# Patient Record
Sex: FEMALE | Race: WHITE | NOT HISPANIC OR LATINO | Employment: FULL TIME | URBAN - METROPOLITAN AREA
[De-identification: names, ages, dates, MRNs, and addresses within clinical notes are randomized per-mention and may not be internally consistent; named-entity substitution may affect disease eponyms.]

---

## 2021-07-07 ENCOUNTER — VBI (OUTPATIENT)
Dept: ADMINISTRATIVE | Facility: OTHER | Age: 56
End: 2021-07-07

## 2021-07-07 NOTE — TELEPHONE ENCOUNTER
Upon review of the In Basket request we were able to locate, review, and update the patient chart as requested for Immunization(s) Influenza and Pap Smear (HPV) aka Cervical Cancer Screening  Any additional questions or concerns should be emailed to the Practice Liaisons via [email or web address] email, please do not reply via In Basket      Thank you  Oniel Mejia

## 2021-07-23 PROBLEM — Z87.42 HISTORY OF ENDOMETRIOSIS: Status: ACTIVE | Noted: 2021-07-23

## 2021-07-23 PROBLEM — N60.12 FIBROCYSTIC BREAST CHANGES OF BOTH BREASTS: Status: ACTIVE | Noted: 2021-07-23

## 2021-07-23 PROBLEM — N95.2 VAGINAL ATROPHY: Status: ACTIVE | Noted: 2021-07-23

## 2021-07-23 PROBLEM — N60.11 FIBROCYSTIC BREAST CHANGES OF BOTH BREASTS: Status: ACTIVE | Noted: 2021-07-23

## 2021-07-23 PROBLEM — R92.2 DENSE BREAST TISSUE ON MAMMOGRAM: Status: ACTIVE | Noted: 2021-07-23

## 2021-07-23 PROBLEM — Z78.9: Status: ACTIVE | Noted: 2021-07-23

## 2021-07-23 PROBLEM — R92.30 DENSE BREAST TISSUE ON MAMMOGRAM: Status: ACTIVE | Noted: 2021-07-23

## 2021-07-27 ENCOUNTER — ANNUAL EXAM (OUTPATIENT)
Dept: OBGYN CLINIC | Facility: CLINIC | Age: 56
End: 2021-07-27
Payer: COMMERCIAL

## 2021-07-27 VITALS — WEIGHT: 123 LBS | HEIGHT: 62 IN | BODY MASS INDEX: 22.63 KG/M2

## 2021-07-27 DIAGNOSIS — R92.2 DENSE BREAST TISSUE ON MAMMOGRAM: Primary | ICD-10-CM

## 2021-07-27 DIAGNOSIS — N95.2 VAGINAL ATROPHY: ICD-10-CM

## 2021-07-27 DIAGNOSIS — Z78.0 ASYMPTOMATIC MENOPAUSAL STATE: ICD-10-CM

## 2021-07-27 DIAGNOSIS — Z01.419 ENCOUNTER FOR GYNECOLOGICAL EXAMINATION (GENERAL) (ROUTINE) WITHOUT ABNORMAL FINDINGS: ICD-10-CM

## 2021-07-27 DIAGNOSIS — Z13.820 OSTEOPOROSIS SCREENING: ICD-10-CM

## 2021-07-27 PROCEDURE — S0612 ANNUAL GYNECOLOGICAL EXAMINA: HCPCS | Performed by: OBSTETRICS & GYNECOLOGY

## 2021-07-27 NOTE — LETTER
July 27, 2021     [address]  [facility] [address]    Patient: Jc Fried   YOB: 1965   Date of Visit: 7/27/2021       Dear Dr Molly Donnelly: Thank you for referring Domingo Castro to me for evaluation  Below are my notes for this consultation  If you have questions, please do not hesitate to call me  I look forward to following your patient along with you  Sincerely,        Brandy Gould MD        CC: No Recipients  Brandy Gould MD  7/27/2021  8:43 AM  Incomplete  Assessment/Plan:    Encounter for gynecological examination (general) (routine) without abnormal findings  All well, no complaints  Normal breast and pelvic exams, pap due 2024  Mammo, breast u/s and dexa orders given  Diagnoses and all orders for this visit:    Dense breast tissue on mammogram  -     US breast screening bilateral complete (ABUS); Future    Encounter for gynecological examination (general) (routine) without abnormal findings    Asymptomatic menopausal state  -     DXA bone density spine hip and pelvis; Future    Osteoporosis screening  -     DXA bone density spine hip and pelvis; Future    Vaginal atrophy  -     conjugated estrogens (PREMARIN) vaginal cream; Insert 0 5 g into the vagina 2 (two) times a week    Other orders  -     Discontinue: conjugated estrogens (PREMARIN) vaginal cream; Insert into the vagina daily          Subjective:      Patient ID: Jc Fried is a 55 y o  female  Here for well check  The following portions of the patient's history were reviewed and updated as appropriate: allergies, current medications, past family history, past medical history, past social history, past surgical history and problem list     Review of Systems  No PMB, breast, bladder, bowel changes   No new persistent pain, bloating, early satiety or pelvic pressure    Objective:      Ht 5' 2" (1 575 m)   Wt 55 8 kg (123 lb)   Breastfeeding No   BMI 22 50 kg/m²          Physical Exam  General appearance: no distress, pleasant  Neck: thyroid without nodules or thyromegaly, no palpable adenopathy  Lymph nodes: no palpable adenopathy  Breasts: Dense bilaterally, no masses, nodes or skin changes  Abdomen: soft, non tender, no palpable masses  Pelvic exam: normal atrophic external genitalia, urethral meatus normal, vagina atrophic without lesions, cervix atrophic without lesions, uterus small, slight RV towards left, smooth, non tender, no adnexal masses, non tender  Rectal exam: normal sphincter tone, no masses, RV confirms above      Vianney Joshi MD  7/27/2021  8:42 AM  Sign when Signing Visit  Assessment/Plan:    No problem-specific Assessment & Plan notes found for this encounter  Diagnoses and all orders for this visit:    Dense breast tissue on mammogram    Encounter for gynecological examination (general) (routine) without abnormal findings    Other orders  -     conjugated estrogens (PREMARIN) vaginal cream; Insert into the vagina daily          Subjective:      Patient ID: Maribell Wiseman is a 55 y o  female  Here for well check  The following portions of the patient's history were reviewed and updated as appropriate: allergies, current medications, past family history, past medical history, past social history, past surgical history and problem list     Review of Systems  No PMB, breast, bladder, bowel changes   No new persistent pain, bloating, early satiety or pelvic pressure    Objective:      Ht 5' 2" (1 575 m)   Wt 55 8 kg (123 lb)   Breastfeeding No   BMI 22 50 kg/m²          Physical Exam  General appearance: no distress, pleasant  Neck: thyroid without nodules or thyromegaly, no palpable adenopathy  Lymph nodes: no palpable adenopathy  Breasts: Dense bilaterally, no masses, nodes or skin changes  Abdomen: soft, non tender, no palpable masses  Pelvic exam: normal atrophic external genitalia, urethral meatus normal, vagina atrophic without lesions, cervix atrophic without lesions, uterus small, slight RV towards left, smooth, non tender, no adnexal masses, non tender  Rectal exam: normal sphincter tone, no masses, RV confirms above

## 2021-07-27 NOTE — PROGRESS NOTES
Assessment/Plan:    Encounter for gynecological examination (general) (routine) without abnormal findings  All well, no complaints  Normal breast and pelvic exams, pap due 2024  Mammo, breast u/s and dexa orders given  Diagnoses and all orders for this visit:    Dense breast tissue on mammogram  -     US breast screening bilateral complete (ABUS); Future    Encounter for gynecological examination (general) (routine) without abnormal findings    Asymptomatic menopausal state  -     DXA bone density spine hip and pelvis; Future    Osteoporosis screening  -     DXA bone density spine hip and pelvis; Future    Vaginal atrophy  -     conjugated estrogens (PREMARIN) vaginal cream; Insert 0 5 g into the vagina 2 (two) times a week    Other orders  -     Discontinue: conjugated estrogens (PREMARIN) vaginal cream; Insert into the vagina daily          Subjective:      Patient ID: Meghna Reed is a 55 y o  female  Here for well check  The following portions of the patient's history were reviewed and updated as appropriate: allergies, current medications, past family history, past medical history, past social history, past surgical history and problem list     Review of Systems  No PMB, breast, bladder, bowel changes   No new persistent pain, bloating, early satiety or pelvic pressure    Objective:      Ht 5' 2" (1 575 m)   Wt 55 8 kg (123 lb)   Breastfeeding No   BMI 22 50 kg/m²          Physical Exam  General appearance: no distress, pleasant  Neck: thyroid without nodules or thyromegaly, no palpable adenopathy  Lymph nodes: no palpable adenopathy  Breasts: Dense bilaterally, no masses, nodes or skin changes  Abdomen: soft, non tender, no palpable masses  Pelvic exam: normal atrophic external genitalia, urethral meatus normal, vagina atrophic without lesions, cervix atrophic without lesions, uterus small, slight RV towards left, smooth, non tender, no adnexal masses, non tender  Rectal exam: normal sphincter tone, no masses, RV confirms above

## 2021-07-27 NOTE — PATIENT INSTRUCTIONS
Return to office in one year unless having any problems such as bleeding, new persistent pain, new progressive bloating, new problems eating (getting full to quickly) or new constant urinary pressure that does not resolve in one week

## 2021-07-27 NOTE — ASSESSMENT & PLAN NOTE
All well, no complaints  Normal breast and pelvic exams, pap due 2024  Mammo, breast u/s and dexa orders given

## 2021-08-30 ENCOUNTER — TELEPHONE (OUTPATIENT)
Dept: OBGYN CLINIC | Facility: CLINIC | Age: 56
End: 2021-08-30

## 2021-08-30 NOTE — TELEPHONE ENCOUNTER
Please inform pt of dexa with mild osteopenia in hip and spine  Continue to strive for total calcium intake of 1500 mg and vitamin D of 1000 IU in combined dietary and supplement forms  Avoid excess calcium as this may adversely effect the arteries in the heart  I would repeat the test in 5 years  Thanks  (Spine T-1 7; hip T-1 2, fem neck T-1 6   Low risk of fracture)

## 2021-08-31 NOTE — TELEPHONE ENCOUNTER
Return call from BODØ  Her  works in radiology and reviewed her result with her  Reviewed Dr Antonella Vogt recommendations  Patient verbalized understanding and agreement to plan

## 2021-11-11 ENCOUNTER — TELEPHONE (OUTPATIENT)
Dept: OBGYN CLINIC | Facility: CLINIC | Age: 56
End: 2021-11-11

## 2021-11-11 DIAGNOSIS — Z12.31 ENCOUNTER FOR SCREENING MAMMOGRAM FOR MALIGNANT NEOPLASM OF BREAST: Primary | ICD-10-CM

## 2022-09-17 NOTE — PROGRESS NOTES
Assessment/Plan:    Encounter for gynecological examination (general) (routine) without abnormal findings  Three kids all off to college! No complaitns  Normal breast and pelvic exams  Last pap 2019 neg/HPV neg  Pap due   Mammo and bilateral u/s orders given  Up to date with colonoscopy, last , due        Diagnoses and all orders for this visit:    Encounter for gynecological examination (general) (routine) without abnormal findings    Encounter for screening mammogram for malignant neoplasm of breast  -     Mammo screening bilateral w 3d & cad; Future    Dense breast tissue on mammogram  -     US breast screening bilateral complete (ABUS); Future          Subjective:      Patient ID: Avery Giordano is a 65 y o  female  HPI Here for well check  The following portions of the patient's history were reviewed and updated as appropriate:   She  has a past medical history of Diffuse cystic mastopathy, Ectopic pregnancy (2003), History of pelvic ultrasound (2020), Miscarriage, and Papanicolaou smear ()  She   Patient Active Problem List    Diagnosis Date Noted    Encounter for gynecological examination (general) (routine) without abnormal findings 2021    Fibrocystic breast changes of both breasts 2021    Dense breast tissue on mammogram 2021    Vaginal atrophy 2021    History of therapy for infertility 2021    History of endometriosis 2021     She  has a past surgical history that includes  section; Gynecologic cryosurgery; Mammo (historical) (2020); Laparoscopic endometriosis fulguration (); and Colonoscopy ()  Her family history includes Colon cancer in her paternal grandmother; Heart disease in her father and mother; Hypertension in her mother; Uterine cancer in her maternal grandmother  She  reports that she has never smoked  She has never used smokeless tobacco  She reports current alcohol use   She reports that she does not use drugs  Current Outpatient Medications   Medication Sig Dispense Refill    conjugated estrogens (PREMARIN) vaginal cream Insert 0 5 g into the vagina 2 (two) times a week 30 g 3     No current facility-administered medications for this visit  She has No Known Allergies       Review of Systems  No PMB, breast, bladder, bowel changes   No new persistent pain, bloating, early satiety or pelvic pressure      Objective:      /69 (BP Location: Left arm, Patient Position: Sitting, Cuff Size: Standard)   Ht 5' 3" (1 6 m)   Wt 55 2 kg (121 lb 12 8 oz)   BMI 21 58 kg/m²          Physical Exam    General appearance: no distress, pleasant  Neck: thyroid without nodules or thyromegaly, no palpable adenopathy  Lymph nodes: no palpable adenopathy  Breasts: Dense bilaterally, no masses, nodes or skin changes  Abdomen: soft, non tender, no palpable masses  Pelvic exam: normal atrophic external genitalia, urethral meatus normal, vagina atrophic without lesions, cervix atrophic without lesions, uterus small, slight RV towards left, smooth, non tender, no adnexal masses, non tender  Rectal exam: normal sphincter tone, no masses, RV confirms above

## 2022-09-20 ENCOUNTER — ANNUAL EXAM (OUTPATIENT)
Dept: OBGYN CLINIC | Facility: CLINIC | Age: 57
End: 2022-09-20

## 2022-09-20 VITALS
BODY MASS INDEX: 21.58 KG/M2 | DIASTOLIC BLOOD PRESSURE: 69 MMHG | WEIGHT: 121.8 LBS | HEIGHT: 63 IN | SYSTOLIC BLOOD PRESSURE: 102 MMHG

## 2022-09-20 DIAGNOSIS — Z01.419 ENCOUNTER FOR GYNECOLOGICAL EXAMINATION (GENERAL) (ROUTINE) WITHOUT ABNORMAL FINDINGS: Primary | ICD-10-CM

## 2022-09-20 DIAGNOSIS — Z12.31 ENCOUNTER FOR SCREENING MAMMOGRAM FOR MALIGNANT NEOPLASM OF BREAST: ICD-10-CM

## 2022-09-20 DIAGNOSIS — R92.2 DENSE BREAST TISSUE ON MAMMOGRAM: ICD-10-CM

## 2022-09-20 NOTE — ASSESSMENT & PLAN NOTE
Three kids all off to college! No complaitns  Normal breast and pelvic exams  Last pap 2/2019 neg/HPV neg   Pap due 2024  Mammo and bilateral u/s orders given  Up to date with colonoscopy, last 2017, due 2027

## 2022-09-20 NOTE — LETTER
September 22, 2022     [address]  [facility] [address]    Patient: Tena Moulton   YOB: 1965   Date of Visit: 9/20/2022       Dear Dr Nash Stanley: Thank you for referring Ronel Friday to me for evaluation  Below are my notes for this consultation  If you have questions, please do not hesitate to call me  I look forward to following your patient along with you  Sincerely,        Danielle Grant MD        CC: No Recipients  Danielle Grant MD  9/22/2022 11:15 AM  Sign when Signing Visit  Assessment/Plan:    Encounter for gynecological examination (general) (routine) without abnormal findings  Three kids all off to college! No complaitns  Normal breast and pelvic exams  Last pap 2/2019 neg/HPV neg  Pap due 2024  Mammo and bilateral u/s orders given  Up to date with colonoscopy, last 2017, due 2027       Diagnoses and all orders for this visit:    Encounter for gynecological examination (general) (routine) without abnormal findings    Encounter for screening mammogram for malignant neoplasm of breast  -     Mammo screening bilateral w 3d & cad; Future    Dense breast tissue on mammogram  -     US breast screening bilateral complete (ABUS); Future          Subjective:      Patient ID: Tena Moulton is a 65 y o  female  HPI Here for well check  The following portions of the patient's history were reviewed and updated as appropriate:   She  has a past medical history of Diffuse cystic mastopathy, Ectopic pregnancy (01/28/2003), History of pelvic ultrasound (05/28/2020), Miscarriage, and Papanicolaou smear (2019)    She   Patient Active Problem List    Diagnosis Date Noted    Encounter for gynecological examination (general) (routine) without abnormal findings 07/27/2021    Fibrocystic breast changes of both breasts 07/23/2021    Dense breast tissue on mammogram 07/23/2021    Vaginal atrophy 07/23/2021    History of therapy for infertility 2021    History of endometriosis 2021     She  has a past surgical history that includes  section; Gynecologic cryosurgery; Mammo (historical) (2020); Laparoscopic endometriosis fulguration (); and Colonoscopy ()  Her family history includes Colon cancer in her paternal grandmother; Heart disease in her father and mother; Hypertension in her mother; Uterine cancer in her maternal grandmother  She  reports that she has never smoked  She has never used smokeless tobacco  She reports current alcohol use  She reports that she does not use drugs  Current Outpatient Medications   Medication Sig Dispense Refill    conjugated estrogens (PREMARIN) vaginal cream Insert 0 5 g into the vagina 2 (two) times a week 30 g 3     No current facility-administered medications for this visit  She has No Known Allergies       Review of Systems  No PMB, breast, bladder, bowel changes   No new persistent pain, bloating, early satiety or pelvic pressure      Objective:      /69 (BP Location: Left arm, Patient Position: Sitting, Cuff Size: Standard)   Ht 5' 3" (1 6 m)   Wt 55 2 kg (121 lb 12 8 oz)   BMI 21 58 kg/m²          Physical Exam    General appearance: no distress, pleasant  Neck: thyroid without nodules or thyromegaly, no palpable adenopathy  Lymph nodes: no palpable adenopathy  Breasts: Dense bilaterally, no masses, nodes or skin changes  Abdomen: soft, non tender, no palpable masses  Pelvic exam: normal atrophic external genitalia, urethral meatus normal, vagina atrophic without lesions, cervix atrophic without lesions, uterus small, slight RV towards left, smooth, non tender, no adnexal masses, non tender  Rectal exam: normal sphincter tone, no masses, RV confirms above

## 2023-01-26 DIAGNOSIS — Z12.31 ENCOUNTER FOR SCREENING MAMMOGRAM FOR MALIGNANT NEOPLASM OF BREAST: ICD-10-CM

## 2023-07-03 ENCOUNTER — TELEPHONE (OUTPATIENT)
Dept: OBGYN CLINIC | Facility: CLINIC | Age: 58
End: 2023-07-03

## 2023-07-03 DIAGNOSIS — N95.2 VAGINAL ATROPHY: ICD-10-CM

## 2023-07-03 RX ORDER — CONJUGATED ESTROGENS 0.62 MG/G
0.5 CREAM VAGINAL 2 TIMES WEEKLY
Qty: 30 G | Refills: 0 | Status: SHIPPED | OUTPATIENT
Start: 2023-07-03

## 2023-07-03 NOTE — TELEPHONE ENCOUNTER
1 tube sent to pharmacy.  She is due for a wellness this fall and may want to schedule that now due to wait time for appointment

## 2023-11-04 NOTE — PROGRESS NOTES
Assessment/Plan:    Encounter for gynecological examination (general) (routine) without abnormal findings  Here for well check, no complaints. Doing well with vaginal estrogen, refill rx given. Normal breast and pelvic exams. Last pap 2/2019 neg/HPV neg; repeated today  Mammo and u/s orders given, last 1/3/23 with normal bilateral screening u/s  Colonoscopy 2017, due 2027  Dexa 8/2021 mild osteopenia; calcium recs reviewed; repeat next time. Diagnoses and all orders for this visit:    Extremely dense tissue of both breasts on mammography  -     Mammo screening bilateral w 3d & cad; Future  -     US breast screening bilateral complete (ABUS); Future    Encounter for gynecological examination (general) (routine) without abnormal findings    Breast cancer screening by mammogram  -     Mammo screening bilateral w 3d & cad; Future    Screening for malignant neoplasm of the cervix  -     IGP, Aptima HPV, Rfx 16/18,45    Vaginal atrophy  -     estradiol (ESTRACE VAGINAL) 0.1 mg/g vaginal cream; Insert 1 g into the vagina 2 (two) times a week          Subjective:      Patient ID: Cristine Leyden is a 63 y.o. female. HPI Here for well check. The following portions of the patient's history were reviewed and updated as appropriate: She  has a past medical history of Diffuse cystic mastopathy, Ectopic pregnancy (01/28/2003), History of pelvic ultrasound (05/28/2020), History of screening mammography (01/03/2023), Miscarriage, Papanicolaou smear (2019), and Varicella (1973).   She   Patient Active Problem List    Diagnosis Date Noted    Encounter for gynecological examination (general) (routine) without abnormal findings 07/27/2021    Fibrocystic breast changes of both breasts 07/23/2021    Dense breast tissue on mammogram 07/23/2021    Vaginal atrophy 07/23/2021    History of therapy for infertility 07/23/2021    History of endometriosis 07/23/2021     She  has a past surgical history that includes  section; Gynecologic cryosurgery; Mammo (historical) (2020); Laparoscopic endometriosis fulguration (); and Colonoscopy (). Her family history includes Alcohol abuse in her maternal grandfather, paternal grandfather, and paternal grandmother; Breast cancer in her paternal aunt; COPD in her maternal grandmother; Cancer in her paternal grandfather; Colon cancer in her paternal grandmother; Gout in her maternal grandfather; Heart disease in her father, maternal grandfather, and mother; Hyperlipidemia in her maternal grandfather; Hypertension in her maternal grandfather and mother; Leukemia in her paternal aunt; Lung cancer in her maternal grandfather; Melanoma in her paternal grandfather; No Known Problems in her brother, daughter, son, and son; Obesity in her paternal grandmother; Osteoarthritis in her mother; Osteoporosis in her maternal grandmother; Prostate cancer in her father; Stroke in her maternal grandfather; Uterine cancer in her maternal grandmother. She  reports that she has never smoked. She has never used smokeless tobacco. She reports current alcohol use. She reports that she does not use drugs. Current Outpatient Medications   Medication Sig Dispense Refill    [START ON 2023] estradiol (ESTRACE VAGINAL) 0.1 mg/g vaginal cream Insert 1 g into the vagina 2 (two) times a week 42.5 g 2     No current facility-administered medications for this visit. She has No Known Allergies. .    Review of Systems  No PMB, breast, bladder, bowel changes.  No new persistent pain, bloating, early satiety or pelvic pressure      Objective:      /60 (BP Location: Left arm, Patient Position: Sitting, Cuff Size: Standard)   Ht 5' 2.25" (1.581 m)   Wt 55.6 kg (122 lb 9.6 oz)   BMI 22.24 kg/m²          Physical Exam    General appearance: no distress, pleasant  Neck: thyroid without nodules or thyromegaly, no palpable adenopathy  Lymph nodes: no palpable adenopathy  Breasts: no masses, nodes or skin changes; dense bilaterally  Abdomen: soft, non tender, no palpable masses  Pelvic exam: normal atrophic external genitalia, urethral meatus normal, vagina atrophic without lesions, cervix atrophic without lesions (friable with cytobrush), slightly RV uterus small, non tender, no adnexal masses, non tender  Rectal exam: normal sphincter tone, no masses, RV confirms above

## 2023-11-07 ENCOUNTER — ANNUAL EXAM (OUTPATIENT)
Dept: OBGYN CLINIC | Facility: CLINIC | Age: 58
End: 2023-11-07
Payer: COMMERCIAL

## 2023-11-07 VITALS
WEIGHT: 122.6 LBS | BODY MASS INDEX: 22.56 KG/M2 | SYSTOLIC BLOOD PRESSURE: 120 MMHG | DIASTOLIC BLOOD PRESSURE: 60 MMHG | HEIGHT: 62 IN

## 2023-11-07 DIAGNOSIS — N95.2 VAGINAL ATROPHY: ICD-10-CM

## 2023-11-07 DIAGNOSIS — Z12.31 BREAST CANCER SCREENING BY MAMMOGRAM: ICD-10-CM

## 2023-11-07 DIAGNOSIS — Z12.4 SCREENING FOR MALIGNANT NEOPLASM OF THE CERVIX: ICD-10-CM

## 2023-11-07 DIAGNOSIS — Z01.419 ENCOUNTER FOR GYNECOLOGICAL EXAMINATION (GENERAL) (ROUTINE) WITHOUT ABNORMAL FINDINGS: Primary | ICD-10-CM

## 2023-11-07 DIAGNOSIS — R92.343 EXTREMELY DENSE TISSUE OF BOTH BREASTS ON MAMMOGRAPHY: ICD-10-CM

## 2023-11-07 PROCEDURE — S0612 ANNUAL GYNECOLOGICAL EXAMINA: HCPCS | Performed by: OBSTETRICS & GYNECOLOGY

## 2023-11-07 RX ORDER — ESTRADIOL 0.1 MG/G
1 CREAM VAGINAL 2 TIMES WEEKLY
Qty: 42.5 G | Refills: 2 | Status: SHIPPED | OUTPATIENT
Start: 2023-11-09

## 2023-11-07 NOTE — ASSESSMENT & PLAN NOTE
Here for well check, no complaints. Doing well with vaginal estrogen, refill rx given. Normal breast and pelvic exams. Last pap 2/2019 neg/HPV neg; repeated today  Mammo and u/s orders given, last 1/3/23 with normal bilateral screening u/s  Colonoscopy 2017, due 2027  Dexa 8/2021 mild osteopenia; calcium recs reviewed; repeat next time.

## 2023-11-07 NOTE — LETTER
November 7, 2023     Too Tapia, [address] [facility]    Patient: Cony Colon   YOB: 1965   Date of Visit: 11/7/2023       Dear Melquiades Blackwell: Thank you for referring Carla Valle to me for evaluation. Below are my notes for this consultation. If you have questions, please do not hesitate to call me. I look forward to following your patient along with you. Sincerely,        Trevor Boles MD        CC: No Recipients    Trevor Boles MD  11/7/2023 10:25 AM  Sign when Signing Visit  Assessment/Plan:    Encounter for gynecological examination (general) (routine) without abnormal findings  Here for well check, no complaints. Doing well with vaginal estrogen, refill rx given. Normal breast and pelvic exams. Last pap 2/2019 neg/HPV neg; repeated today  Mammo and u/s orders given, last 1/3/23 with normal bilateral screening u/s  Colonoscopy 2017, due 2027  Dexa 8/2021 mild osteopenia; calcium recs reviewed; repeat next time. Diagnoses and all orders for this visit:    Extremely dense tissue of both breasts on mammography  -     Mammo screening bilateral w 3d & cad; Future  -     US breast screening bilateral complete (ABUS); Future    Encounter for gynecological examination (general) (routine) without abnormal findings    Breast cancer screening by mammogram  -     Mammo screening bilateral w 3d & cad; Future    Screening for malignant neoplasm of the cervix  -     IGP, Aptima HPV, Rfx 16/18,45    Vaginal atrophy  -     estradiol (ESTRACE VAGINAL) 0.1 mg/g vaginal cream; Insert 1 g into the vagina 2 (two) times a week          Subjective:      Patient ID: Cony Colon is a 63 y.o. female. HPI Here for well check.     The following portions of the patient's history were reviewed and updated as appropriate: She  has a past medical history of Diffuse cystic mastopathy, Ectopic pregnancy (01/28/2003), History of pelvic ultrasound (2020), History of screening mammography (2023), Miscarriage, Papanicolaou smear (), and Varicella (). She   Patient Active Problem List    Diagnosis Date Noted   • Encounter for gynecological examination (general) (routine) without abnormal findings 2021   • Fibrocystic breast changes of both breasts 2021   • Dense breast tissue on mammogram 2021   • Vaginal atrophy 2021   • History of therapy for infertility 2021   • History of endometriosis 2021     She  has a past surgical history that includes  section; Gynecologic cryosurgery; Mammo (historical) (2020); Laparoscopic endometriosis fulguration (); and Colonoscopy (). Her family history includes Alcohol abuse in her maternal grandfather, paternal grandfather, and paternal grandmother; Breast cancer in her paternal aunt; COPD in her maternal grandmother; Cancer in her paternal grandfather; Colon cancer in her paternal grandmother; Gout in her maternal grandfather; Heart disease in her father, maternal grandfather, and mother; Hyperlipidemia in her maternal grandfather; Hypertension in her maternal grandfather and mother; Leukemia in her paternal aunt; Lung cancer in her maternal grandfather; Melanoma in her paternal grandfather; No Known Problems in her brother, daughter, son, and son; Obesity in her paternal grandmother; Osteoarthritis in her mother; Osteoporosis in her maternal grandmother; Prostate cancer in her father; Stroke in her maternal grandfather; Uterine cancer in her maternal grandmother. She  reports that she has never smoked. She has never used smokeless tobacco. She reports current alcohol use. She reports that she does not use drugs.   Current Outpatient Medications   Medication Sig Dispense Refill   • [START ON 2023] estradiol (ESTRACE VAGINAL) 0.1 mg/g vaginal cream Insert 1 g into the vagina 2 (two) times a week 42.5 g 2     No current facility-administered medications for this visit. She has No Known Allergies. .    Review of Systems  No PMB, breast, bladder, bowel changes.  No new persistent pain, bloating, early satiety or pelvic pressure      Objective:      /60 (BP Location: Left arm, Patient Position: Sitting, Cuff Size: Standard)   Ht 5' 2.25" (1.581 m)   Wt 55.6 kg (122 lb 9.6 oz)   BMI 22.24 kg/m²          Physical Exam    General appearance: no distress, pleasant  Neck: thyroid without nodules or thyromegaly, no palpable adenopathy  Lymph nodes: no palpable adenopathy  Breasts: no masses, nodes or skin changes; dense bilaterally  Abdomen: soft, non tender, no palpable masses  Pelvic exam: normal atrophic external genitalia, urethral meatus normal, vagina atrophic without lesions, cervix atrophic without lesions (friable with cytobrush), slightly RV uterus small, non tender, no adnexal masses, non tender  Rectal exam: normal sphincter tone, no masses, RV confirms above

## 2023-11-14 LAB
CYTOLOGIST CVX/VAG CYTO: NORMAL
DX ICD CODE: NORMAL
HPV GENOTYPE REFLEX: NORMAL
HPV I/H RISK 4 DNA CVX QL PROBE+SIG AMP: NEGATIVE
Lab: NORMAL
OTHER STN SPEC: NORMAL
PATH REPORT.FINAL DX SPEC: NORMAL
SL AMB NOTE:: NORMAL
SL AMB SPECIMEN ADEQUACY: NORMAL
SL AMB TEST METHODOLOGY: NORMAL

## 2024-02-21 PROBLEM — Z01.419 ENCOUNTER FOR GYNECOLOGICAL EXAMINATION (GENERAL) (ROUTINE) WITHOUT ABNORMAL FINDINGS: Status: RESOLVED | Noted: 2021-07-27 | Resolved: 2024-02-21

## 2024-06-20 DIAGNOSIS — R92.343 EXTREMELY DENSE TISSUE OF BOTH BREASTS ON MAMMOGRAPHY: ICD-10-CM

## 2024-06-20 DIAGNOSIS — Z12.31 BREAST CANCER SCREENING BY MAMMOGRAM: ICD-10-CM

## 2024-12-27 DIAGNOSIS — N95.2 VAGINAL ATROPHY: ICD-10-CM

## 2024-12-27 RX ORDER — ESTRADIOL 0.1 MG/G
1 CREAM VAGINAL 2 TIMES WEEKLY
Qty: 42.5 G | Refills: 0 | Status: SHIPPED | OUTPATIENT
Start: 2024-12-30

## 2025-05-03 DIAGNOSIS — N95.2 VAGINAL ATROPHY: ICD-10-CM

## 2025-05-05 RX ORDER — ESTRADIOL 0.1 MG/G
1 CREAM VAGINAL 2 TIMES WEEKLY
Qty: 42.5 G | Refills: 0 | Status: SHIPPED | OUTPATIENT
Start: 2025-05-05